# Patient Record
(demographics unavailable — no encounter records)

---

## 2024-10-07 NOTE — REASON FOR VISIT
[Follow-Up: _____] : a [unfilled] follow-up visit [FreeTextEntry1] : Sjogren syndrome; + early Sjogren abs; Sicca symptoms; OA; Osteopenia w/ high Frax=Osteoporosis; labs/med

## 2024-10-07 NOTE — ASSESSMENT
[FreeTextEntry1] : 80-year-old female, here for follow up w/ OA hands/knees/DDD; w/ discogenic disease; w/ Osteopenia with high Frax that should be treated as Osteoporosis per ACR guidelines. She has + early Sjogren abs w/ + CA VI IgA=62.9 and +PSP IgA ab=69.6 and has dry eyes w/ Optho & dry mouth at times concerning for Sjogren syndrome.  Sjogren syndrome: + early Sjogren abs w/ + CA VI IgA=62.9 and +PSP IgA ab=69.6 -dry eyes: on on artificial tears w/ Optho, Dr. Garcia; RO/LA negative -dry mouth: discussed biotene lozenges prescribed; biotene mouthwash or toothpaste PRN -reviewed labs 8/16/24 [facility] w/ CMP normal with normal Creat=0.60; normal Ca=9.8; 7/1/24 w/ CBC ok; tick panel negative; 11/22/23 w/ BMP w/ normal Creat=0.61; high Ca=10.8 (off Ca supplements and has hx of high PTH w/ 2 glands removed & saw her Endocrine detailed below); 10/2/23 with BMP ok; 12/5/23 w/ normal ESR now; CRP normal; + early Sjogren abs w/ + CA VI IgA=62.9 and +PSP IgA ab=69.6; 8/15/22 w/ raised decreasing ESR=21 (from 31; ok for age); CRP normal; BMP ok; vit D normal; labs 9/14/2021 w/ CBC ok; CMP w/. NL creat; high Ca=11.1 (to see her Endo w/ high PTH) 12/24/2020 w/ raised ESR=31; normal CRP; all serologies within normal limits at this time -US b/l hands 11/16/21 OA; no synovitis -No synovitis or effusion on exam noted today and advised to monitor. -xray b/l hands 12/30/2021 with OA; no erosions  Cervicalgia: intermittent; not much today -MRI c-spine 3/17/2021 w/ multilevel cervical spondylosis -xray c-spine 12/30/20201 w/ retrolisthesis w/ dynamic instability; straightened lordosis w/ muscle spasm -defers muscle relaxer today given can make drowsy -states she did PT and defers it now  LBP: intermittent -xray LS spine 12/30/22 w/ DDD; SI normal -Advil PRN w/ food if needed sparingly -Tylenol PRN helps  Lt knee OA:  -reports pain improved after Durolane gel injection 11/30/23 -xray 12/30/2021 w/ Lt knee OA -has voltaren gel 1% PRN -had handout on knee exercises given for OA -patient defers PT -Rt knee w/ OA and replaced  Osteopenia w/ high risk of fracture: reviewed Dexa 2/2/23 w/ Osteopenia but with high frax at hip 3.4 % (from 3.5 %) and as per guidelines >3% so should be treated osteoporosis -reviewed lab 8/16/24 w/ CMP normal with normal Creat=0.60; normal Ca=9.8; 11/22/23 BMP w/ normal Creat=0.61; high Ca=10.8 (off Ca supplements and has hx of high PTH w/ 2 glands removed told to monitor w/ her Endocrine, Dr. Pringle at [facility] that she does) -discussed r/b/s of refilling Fosamax 70 mg PO weekly w/ pt agreeable and prescription sent as below -labs as below to monitor -denies any dental issues w/ no recent or plans for any dental extraction or implants and will inform her dentist she is on fosamax -takes vit D 1000 IU PO daily -off Ca w/ high Ca and high PTH that she monitors w/ her Dr. AMBER Srivastava at [facility] -encouraged weight bearing exercises as tolerated.  Hypercalcemia: high Ca=10.8 on labs 11/22/23; hx of High Parathyroid: on labs high PTH=76 -reports hx of parathyroidectomy of 2 glands -reports she is off Ca -states she saw her EndoDr. Pringle at [facility] 4/9/24 and told likely mild hyperparathyroidism without intervention at this time and will monitor q 6 mo w/ f/u 10/9/24  -educated on symptoms to monitor for in detail and alert us if any concerns. -knows to stay up to date on health maintenance w/ PCP -f/u in 2-3 mo or sooner as needed.

## 2024-10-07 NOTE — HISTORY OF PRESENT ILLNESS
[FreeTextEntry1] : 80-year-old female here for follow up w/ OA hands/knees/DDD; w/ discogenic disease; w/ Osteopenia with high Frax that should be treated as Osteoporosis per ACR guidelines. She has + early Sjogren abs w/ + CA VI IgA=62.9 and +PSP IgA ab=69.6 and has dry eyes w/ Optho & dry mouth at times concerning for Sjogren syndrome.  Today she states she is taking fosamax weekly & tolerating well without side effects at this time. States she stopped her Ca for high Ca w/ normal Ca on labs 4/10/24. She reports hx of parathyroidectomy of 2 glands in the past w/ hx of hypercalcemia that she monitors w/ her Endocrine. She saw her Endo, Dr. Pringle at [facility] 4/9/24 and told likely mild hyperparathyroidism without intervention at this time and will monitor q 6 mo w/ f/u 10/9/24. States she has some chronic achy left knee pain w/ OA that is improved today. States Durolane gel injection given 11/30/23 did help. Denies any crystal arthropathy like attacks thus far. States she notices neck pain for past >10 years intermittently and not much today. Patient states she's been noticing achy lower back pain for > 20 years on & off. She states she notices lower back pain worse with lifting, bending, and sitting. She states lower back pain is improved with stretching, walking and moving around. Denies any loss of bladder or bowel incontinence or saddle anesthesia. States Motrin gives her GI issues so she takes Tylenol p.r.n. that can help. States she's had epidural injection the pain management and they help on and off. Has decent ROM in b/l shoulders, no pelvic/girdle stiffness and able to stand up without using her hands, no temporal pain/unremitting headaches, no vision changes, no jaw pain. Denies any fever/chills, no rashes, no ulcers, + dry eyes on artificial tears w/ Optho, Dr. Kirby, + dry mouth at times, no raynaud's, no infectious diarrhea or  symptoms at this time.

## 2025-04-14 NOTE — PHYSICAL EXAM
[General Appearance - Alert] : alert [General Appearance - In No Acute Distress] : in no acute distress [Sclera] : the sclera and conjunctiva were normal [Extraocular Movements] : extraocular movements were intact [Outer Ear] : the ears and nose were normal in appearance [Neck Appearance] : the appearance of the neck was normal [Respiration, Rhythm And Depth] : normal respiratory rhythm and effort [Heart Rate And Rhythm] : heart rate was normal and rhythm regular [Heart Sounds] : normal S1 and S2 [Abdomen Soft] : soft [Abdomen Tenderness] : non-tender [Cervical Lymph Nodes Enlarged Anterior Bilaterally] : anterior cervical [No CVA Tenderness] : no ~M costovertebral angle tenderness [Supraclavicular Lymph Nodes Enlarged Bilaterally] : supraclavicular [Motor Tone] : muscle strength and tone were normal [] : no rash [No Focal Deficits] : no focal deficits [Impaired Insight] : insight and judgment were intact [Mood] : the mood was normal [FreeTextEntry1] : No synovitis or effusion on exam noted today; Good ROM in b/l shoulders, no pelvic/girdle stiffness and able to stand up without using her hands

## 2025-04-14 NOTE — HISTORY OF PRESENT ILLNESS
[FreeTextEntry1] : 80-year-old female here for follow up w/ OA hands/knees/DDD; w/ discogenic disease; w/ Osteopenia with high Frax that should be treated as Osteoporosis per ACR guidelines. She has + early Sjogren abs w/ + CA VI IgA=62.9 and +PSP IgA ab=69.6 and has dry eyes w/ Optho & dry mouth at times concerning for Sjogren syndrome.  Today she states she is taking fosamax weekly & tolerating well without side effects at this time. States she stopped her Ca for high Ca w/ normal Ca on labs 4/8/25. She reports hx of parathyroidectomy of 2 glands in the past w/ hx of hypercalcemia that she monitors w/ her Endocrine. She saw her Endo, Dr. Pringle at [facility] 4/9/24 and told likely mild hyperparathyroidism without intervention at this time and knows to monitor there. States she has some chronic achy left knee pain w/ OA that is improved today. States Durolane gel injection given 11/30/23 did help. Denies any crystal arthropathy like attacks thus far. States she notices neck pain for past >10 years intermittently and not much today. Patient states she's been noticing achy lower back pain for > 20 years on & off. She states she notices lower back pain worse with lifting, bending, and sitting. She states lower back pain is improved with stretching, walking and moving around. Denies any loss of bladder or bowel incontinence or saddle anesthesia. States Motrin gives her GI issues so she takes Tylenol p.r.n. that can help. States she's had epidural injection the pain management and they help on and off. Has decent ROM in b/l shoulders, no pelvic/girdle stiffness and able to stand up without using her hands, no temporal pain/unremitting headaches, no vision changes, no jaw pain. Denies any fever/chills, no rashes, no ulcers, + dry eyes on artificial tears w/ Optho, Dr. Kirby, + dry mouth at times, no raynaud's, no infectious diarrhea or  symptoms at this time.

## 2025-04-14 NOTE — ASSESSMENT
[FreeTextEntry1] : 80-year-old female, here for follow up w/ OA hands/knees/DDD; w/ discogenic disease; w/ Osteopenia with high Frax that should be treated as Osteoporosis per ACR guidelines. She has + early Sjogren abs w/ + CA VI IgA=62.9 and +PSP IgA ab=69.6 and has dry eyes w/ Optho & dry mouth at times concerning for Sjogren syndrome.  Sjogren syndrome: + early Sjogren abs w/ + CA VI IgA=62.9 and +PSP IgA ab=69.6 -dry eyes: on on artificial tears w/ Optho, Dr. Garcia; RO/LA negative -dry mouth: improved; discussed biotene lozenges; biotene mouthwash or toothpaste PRN -reviewed labs 4/8/25 [facility] w/ BMP normal with normal Creat=0.55; normal Ca=9.9; 7/1/24 w/ CBC ok; tick panel negative; 11/22/23 w/ BMP w/ normal Creat=0.61; high Ca=10.8 (off Ca supplements and has hx of high PTH w/ 2 glands removed & saw her Endocrine detailed below); 10/2/23 with BMP ok; 12/5/23 w/ normal ESR now; CRP normal; + early Sjogren abs w/ + CA VI IgA=62.9 and +PSP IgA ab=69.6; 8/15/22 w/ raised decreasing ESR=21 (from 31; ok for age); CRP normal; BMP ok; vit D normal; labs 9/14/2021 w/ CBC ok; CMP w/. NL creat; high Ca=11.1 (to see her Endo w/ high PTH) 12/24/2020 w/ raised ESR=31; normal CRP; all serologies within normal limits at this time -US b/l hands 11/16/21 OA; no synovitis -No synovitis or effusion on exam noted today and advised to monitor. -xray b/l hands 12/30/2021 with OA; no erosions  Cervicalgia: intermittent; not much today -MRI c-spine 3/17/2021 w/ multilevel cervical spondylosis -xray c-spine 12/30/20201 w/ retrolisthesis w/ dynamic instability; straightened lordosis w/ muscle spasm -defers muscle relaxer today given can make drowsy -states she did PT and defers it now  LBP: intermittent -xray LS spine 12/30/22 w/ DDD; SI normal -Advil PRN w/ food if needed sparingly -Tylenol PRN helps  Lt knee OA: -reports pain improved after Durolane gel injection 11/30/23 -xray 12/30/2021 w/ Lt knee OA -has voltaren gel 1% PRN -had handout on knee exercises given for OA -patient defers PT -Rt knee w/ OA and replaced  Osteopenia w/ high risk of fracture: reviewed Dexa 2/2/23 w/ Osteopenia but with high frax at hip 3.4 % (from 3.5 %) and as per guidelines >3% so should be treated osteoporosis -Dexa referral given to monitor  -reviewed lab 4/8/25 w/ BMP normal with normal Creat=0.55; normal Ca=9.9; 11/22/23 BMP w/ normal Creat=0.61; high Ca=10.8 (off Ca supplements and has hx of high PTH w/ 2 glands removed told to monitor w/ her Endocrine, Dr. Pringle at [facility] that she does) -discussed r/b/s of refilling Fosamax 70 mg PO weekly w/ pt agreeable and prescription sent as below -denies any dental issues w/ no recent or plans for any dental extraction or implants and will inform her dentist she is on fosamax -takes vit D 1000 IU PO daily -off Ca w/ high Ca and high PTH that she monitors w/ her Dr. AMBER Srivastava at [facility] -encouraged weight bearing exercises as tolerated.  Hypercalcemia: high Ca=10.8 on labs 11/22/23; hx of High Parathyroid: on labs high PTH=76 -reports hx of parathyroidectomy of 2 glands -reports she is off Ca -states she saw her EndoDr. Pringle at [facility] 4/9/24 and told likely mild hyperparathyroidism without intervention at this time and monitors there  -educated on symptoms to monitor for in detail and alert us if any concerns. -knows to stay up to date on health maintenance w/ PCP -f/u in 2 mo w/ Dexa or sooner as needed.

## 2025-04-14 NOTE — REASON FOR VISIT
[Follow-Up: _____] : a [unfilled] follow-up visit [FreeTextEntry1] : Sjogren syndrome; + early Sjogren abs; Sicca symptoms; OA; Osteopenia w/ high Frax=Osteoporosis; review labs/med; bone health

## 2025-07-24 NOTE — REASON FOR VISIT
[Follow-Up: _____] : a [unfilled] follow-up visit [FreeTextEntry1] : Sjogren syndrome; + early Sjogren abs; Sicca symptoms; OA; Osteopenia w/ high Frax=Osteoporosis; review Dexa/labs/med

## 2025-07-24 NOTE — ASSESSMENT
[FreeTextEntry1] : 81-year-old female, here for follow up w/ OA hands/knees/DDD; w/ discogenic disease; w/ Osteopenia with high Frax that should be treated as Osteoporosis per ACR guidelines. She has + early Sjogren abs w/ + CA VI IgA=62.9 and +PSP IgA ab=69.6 and has dry eyes w/ Optho & dry mouth at times concerning for Sjogren syndrome.  Sjogren syndrome: + early Sjogren abs w/ + CA VI IgA=62.9 and +PSP IgA ab=69.6 -dry eyes: on on artificial tears w/ Optho, Dr. Garcia; RO/LA negative -dry mouth: improved; discussed biotene lozenges; biotene mouthwash or toothpaste PRN -reviewed labs 4/8/25 [facility] w/ BMP normal with normal Creat=0.55; normal Ca=9.9; 7/1/24 w/ CBC ok; tick panel negative; 11/22/23 w/ BMP w/ normal Creat=0.61; high Ca=10.8 (off Ca supplements and has hx of high PTH w/ 2 glands removed & saw her Endocrine detailed below); 10/2/23 with BMP ok; 12/5/23 w/ normal ESR now; CRP normal; + early Sjogren abs w/ + CA VI IgA=62.9 and +PSP IgA ab=69.6; 8/15/22 w/ raised decreasing ESR=21 (from 31; ok for age); CRP normal; BMP ok; vit D normal; labs 9/14/2021 w/ CBC ok; CMP w/. NL creat; high Ca=11.1 (to see her Endo w/ high PTH) 12/24/2020 w/ raised ESR=31; normal CRP; all serologies within normal limits at this time -US b/l hands 11/16/21 OA; no synovitis -No synovitis or effusion on exam noted today and advised to monitor. -xray b/l hands 12/30/2021 with OA; no erosions  Cervicalgia: intermittent; not much today -MRI c-spine 3/17/2021 w/ multilevel cervical spondylosis -xray c-spine 12/30/20201 w/ retrolisthesis w/ dynamic instability; straightened lordosis w/ muscle spasm -defers muscle relaxer today given can make drowsy -states she did PT and defers it now  LBP: intermittent -xray LS spine 12/30/22 w/ DDD; SI normal -Advil PRN w/ food if needed sparingly -Tylenol PRN helps  Lt knee OA: -reports has Lt knee steroid injection yesterday w/ her OrthoDr. Hackett -she had Durolane gel injection 11/30/23 -xray 12/30/2021 w/ Lt knee OA -has voltaren gel 1% PRN -had handout on knee exercises given for OA -patient defers PT -Rt knee w/ OA and replaced  Osteopenia w/ high risk of fracture: reviewed Dexa 5/28/25 w/ Osteopenia but with high frax at hip 4.4 % (> 3%) and as per ACR guidelines should be treated osteoporosis -reviewed lab 4/8/25 w/ BMP normal with normal Creat=0.55; normal Ca=9.9; 11/22/23 BMP w/ normal Creat=0.61; high Ca=10.8 (off Ca supplements and has hx of high PTH w/ 2 glands removed told to monitor w/ her Endocrine, Dr. Pringle at [facility] that she does) -discussed r/b/s of refilling Fosamax 70 mg PO weekly w/ pt agreeable and prescription sent as below -denies any dental issues w/ no recent or plans for any dental extraction or implants and will inform her dentist she is on fosamax -lab w/ BMP and vit D to monitor  -takes vit D 1000 IU PO daily -off Ca w/ high Ca and high PTH that she monitors w/ her Dr. AMBER Srivastava at [facility] -encouraged weight bearing exercises as tolerated.  Hypercalcemia: high Ca=10.8 on labs 11/22/23; hx of High Parathyroid: on labs high PTH=76 -reports hx of parathyroidectomy of 2 glands -reports she is off Ca -states she saw her Dr. Pino Srivastava at [facility] 2025 and told likely mild hyperparathyroidism without intervention at this time and monitors there  -educated on symptoms to monitor for in detail and alert us if any concerns. -knows to stay up to date on health maintenance w/ PCP -f/u in 10-12 weeks w/ labs or sooner as needed.

## 2025-07-24 NOTE — HISTORY OF PRESENT ILLNESS
[FreeTextEntry1] : 81-year-old female here for follow up w/ OA hands/knees/DDD; w/ discogenic disease; w/ Osteopenia with high Frax that should be treated as Osteoporosis per ACR guidelines. She has + early Sjogren abs w/ + CA VI IgA=62.9 and +PSP IgA ab=69.6 and has dry eyes w/ Optho & dry mouth at times concerning for Sjogren syndrome.  Today she states she is taking fosamax weekly & tolerating well without side effects at this time. States she did Dexa to review in detail. States she stopped her Ca for high Ca w/ normal Ca on labs 4/8/25. She reports hx of parathyroidectomy of 2 glands in the past w/ hx of hypercalcemia that she monitors w/ her Endocrine. She saw her Endo, Dr. Pringle at [facility] 2025 and told likely mild hyperparathyroidism without intervention at this time and knows to monitor there. States she has some chronic achy left knee pain w/ OA and had steroid injection yesterday w/ her Ortho, Dr. Rajan. States Durolane gel injection given 11/30/23 did help. Denies any crystal arthropathy like attacks thus far. States she notices neck pain for past >10 years intermittently and not much today. Patient states she's been noticing achy lower back pain for > 20 years on & off. She states she notices lower back pain worse with lifting, bending, and sitting. She states lower back pain is improved with stretching, walking and moving around. Denies any loss of bladder or bowel incontinence or saddle anesthesia. States Motrin gives her GI issues so she takes Tylenol p.r.n. that can help. States she's had epidural injection the pain management and they help on and off. Has decent ROM in b/l shoulders, no pelvic/girdle stiffness and able to stand up without using her hands, no temporal pain/unremitting headaches, no vision changes, no jaw pain. Denies any fever/chills, no rashes, no ulcers, + dry eyes on artificial tears w/ Optho, Dr. Kirby, + dry mouth at times, no raynaud's, no infectious diarrhea or  symptoms at this time.